# Patient Record
Sex: FEMALE | Race: WHITE
[De-identification: names, ages, dates, MRNs, and addresses within clinical notes are randomized per-mention and may not be internally consistent; named-entity substitution may affect disease eponyms.]

---

## 2018-08-07 ENCOUNTER — HOSPITAL ENCOUNTER (OUTPATIENT)
Dept: HOSPITAL 45 - C.LAB1850 | Age: 33
Discharge: HOME | End: 2018-08-07
Attending: NURSE PRACTITIONER
Payer: COMMERCIAL

## 2018-08-07 DIAGNOSIS — E06.3: ICD-10-CM

## 2018-08-07 DIAGNOSIS — E10.9: ICD-10-CM

## 2018-08-07 DIAGNOSIS — Z00.00: Primary | ICD-10-CM

## 2018-08-07 DIAGNOSIS — E55.9: ICD-10-CM

## 2018-08-07 DIAGNOSIS — E03.9: ICD-10-CM

## 2018-08-07 LAB
ALBUMIN SERPL-MCNC: 2.7 GM/DL (ref 3.4–5)
ALP SERPL-CCNC: 62 U/L (ref 45–117)
ALT SERPL-CCNC: 17 U/L (ref 12–78)
AST SERPL-CCNC: 13 U/L (ref 15–37)
BUN SERPL-MCNC: 14 MG/DL (ref 7–18)
CALCIUM SERPL-MCNC: 7.8 MG/DL (ref 8.5–10.1)
CO2 SERPL-SCNC: 23 MMOL/L (ref 21–32)
CREAT SERPL-MCNC: 1.28 MG/DL (ref 0.6–1.2)
GLUCOSE SERPL-MCNC: 152 MG/DL (ref 70–99)
KETONES UR QL STRIP: 125 MG/DL
PH UR: 228 MG/DL (ref 0–200)
POTASSIUM SERPL-SCNC: 4.3 MMOL/L (ref 3.5–5.1)
PROT SERPL-MCNC: 6.7 GM/DL (ref 6.4–8.2)
SODIUM SERPL-SCNC: 135 MMOL/L (ref 136–145)

## 2018-08-08 LAB — HBA1C MFR BLD: 9.9 % (ref 4.5–5.6)

## 2018-08-17 ENCOUNTER — HOSPITAL ENCOUNTER (OUTPATIENT)
Dept: HOSPITAL 45 - C.ULTR | Age: 33
Discharge: HOME | End: 2018-08-17
Attending: NURSE PRACTITIONER
Payer: COMMERCIAL

## 2018-08-17 DIAGNOSIS — E10.65: ICD-10-CM

## 2018-08-17 DIAGNOSIS — I10: Primary | ICD-10-CM

## 2018-08-17 DIAGNOSIS — R79.89: ICD-10-CM

## 2018-08-17 DIAGNOSIS — E10.319: ICD-10-CM

## 2018-08-17 NOTE — DIAGNOSTIC IMAGING REPORT
RENAL ULTRASOUND



HISTORY:      HYPERTENSION



COMPARISON:  None.



FINDINGS:



Right kidney: 10.5 cm. No hydronephrosis. Normal corticomedullary

differentiation and cortical thickness. The lower pole is partially obscured by

overlying bowel gas.



Left kidney: 10.5 cm. No hydronephrosis. Normal corticomedullary differentiation

and cortical thickness.



Bladder: No bladder wall thickening. The bilateral ureteral jets were not

identified.







IMPRESSION:  

Normal renal ultrasound. 







Electronically signed by:  Kuldeep Dc M.D.

8/17/2018 9:52 AM



Dictated Date/Time:  8/17/2018 9:51 AM

## 2018-08-24 ENCOUNTER — HOSPITAL ENCOUNTER (OUTPATIENT)
Dept: HOSPITAL 45 - C.LAB1850 | Age: 33
Discharge: HOME | End: 2018-08-24
Attending: NURSE PRACTITIONER
Payer: COMMERCIAL

## 2018-08-24 DIAGNOSIS — I10: ICD-10-CM

## 2018-08-24 DIAGNOSIS — E10.9: ICD-10-CM

## 2018-08-24 DIAGNOSIS — E55.9: ICD-10-CM

## 2018-08-24 DIAGNOSIS — Z00.00: Primary | ICD-10-CM

## 2018-08-24 LAB
ALBUMIN SERPL-MCNC: 2.9 GM/DL (ref 3.4–5)
ALBUMIN SERPL-MCNC: 2.9 GM/DL (ref 3.4–5)
ALP SERPL-CCNC: 59 U/L (ref 45–117)
ALT SERPL-CCNC: 21 U/L (ref 12–78)
AST SERPL-CCNC: 12 U/L (ref 15–37)
BASOPHILS # BLD: 0.08 K/UL (ref 0–0.2)
BASOPHILS NFR BLD: 1 %
BUN SERPL-MCNC: 30 MG/DL (ref 7–18)
BUN SERPL-MCNC: 31 MG/DL (ref 7–18)
CALCIUM SERPL-MCNC: 8.4 MG/DL (ref 8.5–10.1)
CALCIUM SERPL-MCNC: 8.4 MG/DL (ref 8.5–10.1)
CO2 SERPL-SCNC: 26 MMOL/L (ref 21–32)
CO2 SERPL-SCNC: 26 MMOL/L (ref 21–32)
CREAT SERPL-MCNC: 1.4 MG/DL (ref 0.6–1.2)
CREAT SERPL-MCNC: 1.42 MG/DL (ref 0.6–1.2)
CREAT UR-MCNC: 109 MG/DL
EOS ABS #: 0.16 K/UL (ref 0–0.5)
EOSINOPHIL NFR BLD AUTO: 411 K/UL (ref 130–400)
GLUCOSE SERPL-MCNC: 68 MG/DL (ref 70–99)
GLUCOSE SERPL-MCNC: 71 MG/DL (ref 70–99)
HCT VFR BLD CALC: 35.9 % (ref 37–47)
HGB BLD-MCNC: 12.2 G/DL (ref 12–16)
IG#: 0.01 K/UL (ref 0–0.02)
IMM GRANULOCYTES NFR BLD AUTO: 39 %
KETONES UR QL STRIP: 131 MG/DL
LYMPHOCYTES # BLD: 3.15 K/UL (ref 1.2–3.4)
MCH RBC QN AUTO: 30.1 PG (ref 25–34)
MCHC RBC AUTO-ENTMCNC: 34 G/DL (ref 32–36)
MCV RBC AUTO: 88.6 FL (ref 80–100)
MONO ABS #: 0.56 K/UL (ref 0.11–0.59)
MONOCYTES NFR BLD: 6.9 %
NEUT ABS #: 4.11 K/UL (ref 1.4–6.5)
NEUTROPHILS # BLD AUTO: 2 %
NEUTROPHILS NFR BLD AUTO: 51 %
PH UR: 231 MG/DL (ref 0–200)
PHOSPHATE SERPL-MCNC: 3.6 MG/DL (ref 2.5–4.9)
PMV BLD AUTO: 9 FL (ref 7.4–10.4)
POTASSIUM SERPL-SCNC: 4 MMOL/L (ref 3.5–5.1)
POTASSIUM SERPL-SCNC: 4 MMOL/L (ref 3.5–5.1)
PROT SERPL-MCNC: 6.8 GM/DL (ref 6.4–8.2)
RED CELL DISTRIBUTION WIDTH CV: 12.5 % (ref 11.5–14.5)
RED CELL DISTRIBUTION WIDTH SD: 40.3 FL (ref 36.4–46.3)
SODIUM SERPL-SCNC: 139 MMOL/L (ref 136–145)
SODIUM SERPL-SCNC: 139 MMOL/L (ref 136–145)
WBC # BLD AUTO: 8.07 K/UL (ref 4.8–10.8)

## 2018-08-25 LAB — HBA1C MFR BLD: 8.9 % (ref 4.5–5.6)
